# Patient Record
Sex: MALE | Race: WHITE | Employment: FULL TIME | URBAN - METROPOLITAN AREA
[De-identification: names, ages, dates, MRNs, and addresses within clinical notes are randomized per-mention and may not be internally consistent; named-entity substitution may affect disease eponyms.]

---

## 2019-07-06 ENCOUNTER — APPOINTMENT (OUTPATIENT)
Dept: GENERAL RADIOLOGY | Facility: CLINIC | Age: 34
End: 2019-07-06
Attending: EMERGENCY MEDICINE
Payer: COMMERCIAL

## 2019-07-06 ENCOUNTER — HOSPITAL ENCOUNTER (EMERGENCY)
Facility: CLINIC | Age: 34
Discharge: HOME | End: 2019-07-06
Attending: EMERGENCY MEDICINE | Admitting: EMERGENCY MEDICINE
Payer: COMMERCIAL

## 2019-07-06 VITALS
SYSTOLIC BLOOD PRESSURE: 144 MMHG | RESPIRATION RATE: 18 BRPM | DIASTOLIC BLOOD PRESSURE: 89 MMHG | HEART RATE: 79 BPM | OXYGEN SATURATION: 99 % | TEMPERATURE: 97.7 F
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
## 2019-07-06 DIAGNOSIS — M25.551 HIP PAIN, RIGHT: ICD-10-CM

## 2019-07-06 DIAGNOSIS — T14.8XXA SKIN ABRASION: ICD-10-CM

## 2019-07-06 DIAGNOSIS — S49.91XA ARM INJURY, RIGHT, INITIAL ENCOUNTER: ICD-10-CM

## 2019-07-06 PROCEDURE — 73502 X-RAY EXAM HIP UNI 2-3 VIEWS: CPT

## 2019-07-06 PROCEDURE — 25000128 H RX IP 250 OP 636: Performed by: EMERGENCY MEDICINE

## 2019-07-06 PROCEDURE — 73070 X-RAY EXAM OF ELBOW: CPT | Mod: RT

## 2019-07-06 PROCEDURE — 90471 IMMUNIZATION ADMIN: CPT

## 2019-07-06 PROCEDURE — 73030 X-RAY EXAM OF SHOULDER: CPT | Mod: RT

## 2019-07-06 PROCEDURE — 90715 TDAP VACCINE 7 YRS/> IM: CPT | Performed by: EMERGENCY MEDICINE

## 2019-07-06 PROCEDURE — 99285 EMERGENCY DEPT VISIT HI MDM: CPT | Mod: 25

## 2019-07-06 PROCEDURE — 25000132 ZZH RX MED GY IP 250 OP 250 PS 637: Performed by: EMERGENCY MEDICINE

## 2019-07-06 RX ORDER — IBUPROFEN 600 MG/1
600 TABLET, FILM COATED ORAL ONCE
Status: DISCONTINUED | OUTPATIENT
Start: 2019-07-06 | End: 2019-07-06

## 2019-07-06 RX ORDER — ACETAMINOPHEN 500 MG
1000 TABLET ORAL ONCE
Status: COMPLETED | OUTPATIENT
Start: 2019-07-06 | End: 2019-07-06

## 2019-07-06 RX ADMIN — ACETAMINOPHEN 1000 MG: 500 TABLET, FILM COATED ORAL at 08:14

## 2019-07-06 RX ADMIN — CLOSTRIDIUM TETANI TOXOID ANTIGEN (FORMALDEHYDE INACTIVATED), CORYNEBACTERIUM DIPHTHERIAE TOXOID ANTIGEN (FORMALDEHYDE INACTIVATED), BORDETELLA PERTUSSIS TOXOID ANTIGEN (GLUTARALDEHYDE INACTIVATED), BORDETELLA PERTUSSIS FILAMENTOUS HEMAGGLUTININ ANTIGEN (FORMALDEHYDE INACTIVATED), BORDETELLA PERTUSSIS PERTACTIN ANTIGEN, AND BORDETELLA PERTUSSIS FIMBRIAE 2/3 ANTIGEN 0.5 ML: 5; 2; 2.5; 5; 3; 5 INJECTION, SUSPENSION INTRAMUSCULAR at 08:17

## 2019-07-06 ASSESSMENT — ENCOUNTER SYMPTOMS
VOMITING: 0
WOUND: 1
NAUSEA: 1

## 2019-07-06 NOTE — ED PROVIDER NOTES
History     Chief Complaint:  Fall    HPI   Jalil Kirkland is a 33 year old male who presents to the emergency department today for evaluation after falling off his skateboard yesterday. The patient reports yesterday he fell off his skateboard, landing on his right side, also hitting his head and he was not wearing a helmet. He states he was able to get up after the fall. He states his pain worsened overnight. He denies vomiting, but states this morning while walking he was feeling nauseous because of this pain. The patient's last tetanus booster was in 2011 per the [facility].  He took ibuprofen prior to arrival.    Allergies:  No Known Allergies     Medications:    No current outpatient medications     Past Medical History:    History reviewed.  No pertinent past medical history.     Past Surgical History:    Circumcision   [place] teeth     Family History:    Sister: Allergies, Cardiovascular    Social History:  Smoking Status: Current Every Day Smoker   Types: Cigarettes   Packs/day: 0.50   Years: 4  Smokeless Tobacco: Never Used  Alcohol Use: Positive    Drug use: Negative     Marital Status:  Single     Review of Systems   Gastrointestinal: Positive for nausea. Negative for vomiting.   Skin: Positive for wound.   All other systems reviewed and are negative.        Physical Exam     Patient Vitals for the past 24 hrs:   BP Temp Temp src Pulse Resp SpO2   07/06/19 0759 149/90 97.7  F (36.5  C) Temporal 73 18 99 %     Physical Exam  General:              Well-nourished              Speaking in full sentences              GCS 15  Head:              Abrasion overlying right parietal lobe              No step-off  Eyes:              Conjunctiva without injection or scleral icterus              No dowd sign  ENT:              Moist mucous membranes              No hemotympanum              Nares patent              Pinnae normal  Neck:              Full ROM              No stiffness appreciated  Resp:              Lungs  CTAB              No crackles, wheezing or audible rubs              Good air movement  CV:                    Normal rate, regular rhythm              S1 and S2 present              No murmur, gallop or rub  GI:              BS present              Abdomen soft without distention              Non-tender to light and deep palpation              No guarding or rebound tenderness  Skin:              Warm, dry, well perfused              Superficial abrasions noted over right shoulder, right elbow, palm of right hand, right lower flank/hip, right anterior tib-fib  MSK:              Moves all extremities              No focal deformities or swelling  Neuro:              Alert              Answers questions appropriately              Moves all extremities equally              Gait stable  Psych:              Normal affect, normal mood    Emergency Department Course     Imaging:  Radiology findings were communicated with the patient who voiced understanding of the findings.    XR Elbow Right 2 Views  Right elbow: No acute bony abnormality and no joint space effusion.  Possible mild soft tissue swelling dorsally.  Reading per radiology      XR Pelvis w Hip Right 1 View  Pelvis and right hip: No acute or chronic bony or soft tissue  abnormality.  Reading per radiology      XR Shoulder Right 3 Views  No evidence for acute fracture or dislocation. No significant  degenerative change.  JEANNETTE RIVERA MD  Reading per radiology      Interventions:  0814 Tylenol 1000 mg IV  0817 Tdap 0.5 mL IM    Emergency Department Course:    0802 Nursing notes and vitals reviewed.    0808 I performed an exam of the patient as documented above.     0834 The patient was sent for an elbow x-ray while in the emergency department, results above.      0834 The patient was sent for a pelvis and hip x-ray while in the emergency department, results above.      0834 The patient was sent for a shoulder x-ray while in the emergency department, results  above.      1684 I personally discussed the imaging results with the patient and answered all related questions prior to discharge    Impression & Plan      Medical Decision Making:  Jalil Kirkland is a 33-year-old male presenting to the emergency department following a fall yesterday.  VS reveals multiple superficial abrasions to the right aspect of his body.  None of these require suture repair.  X-rays were obtained of the right shoulder, elbow, and hip, which are negative for acute fracture nor dislocation.  He does exhibit evidence of an abrasion to his scalp, though demonstrates no signs of step-off, hematoma, nor evidence of basilar skull fracture.  I feel dedicated advanced imaging of the head can be deferred safely.  Tetanus status was updated.  Wounds were cleansed and dressed.  Supportive outpatient treatment discussed including rest, Tylenol/ibuprofen and close monitoring for signs of developing infection.  Return to ER with severe pain, inability to walk, or any other concerns.  All questions answered prior to discharge.    Diagnosis:    ICD-10-CM    1. Fall, initial encounter W19.XXXA    2. Skin abrasion T14.8XXA    3. Arm injury, right, initial encounter S49.91XA    4. Hip pain, right M25.551      Disposition:   The patient is discharged to home.     Discharge Medications:  No discharge medications      Scribe Disclosure:  I, Charity Perkins, am serving as a scribe at 8:02 AM on 7/6/2019 to document services personally performed by Mitesh Mccoy MD based on my observations and the provider's statements to me.        [facility] EMERGENCY DEPARTMENT       Mitesh Mccoy MD  07/06/19 4172

## 2019-07-06 NOTE — ED AVS SNAPSHOT
[facility] Emergency Department  [address]  [facility] [address]  Phone:  [phone]  Fax:  [phone]                                    Jalil Kirkland   MRN: [id number]    Department:  [facility] Emergency Department   Date of Visit:  7/6/2019           After Visit Summary Signature Page    I have received my discharge instructions, and my questions have been answered. I have discussed any challenges I see with this plan with the nurse or doctor.    ..........................................................................................................................................  Patient/Patient Representative Signature      ..........................................................................................................................................  Patient Representative Print Name and Relationship to Patient    ..................................................               ................................................  Date                                   Time    ..........................................................................................................................................  Reviewed by Signature/Title    ...................................................              ..............................................  Date                                               Time          22EPIC Rev 08/18

## 2019-07-06 NOTE — ED TRIAGE NOTES
Patient fell while skate boarding onto right side yesterday. Patient has abrasion on right shoulder, elbow, hand, leg & having right hip pain. Patient hit head but denies LOC.